# Patient Record
Sex: FEMALE | Race: NATIVE HAWAIIAN OR OTHER PACIFIC ISLANDER | HISPANIC OR LATINO | URBAN - METROPOLITAN AREA
[De-identification: names, ages, dates, MRNs, and addresses within clinical notes are randomized per-mention and may not be internally consistent; named-entity substitution may affect disease eponyms.]

---

## 2024-08-23 ENCOUNTER — LAB VISIT (OUTPATIENT)
Dept: LAB | Facility: HOSPITAL | Age: 29
End: 2024-08-23
Payer: COMMERCIAL

## 2024-08-23 ENCOUNTER — OFFICE VISIT (OUTPATIENT)
Dept: OBSTETRICS AND GYNECOLOGY | Facility: CLINIC | Age: 29
End: 2024-08-23
Payer: COMMERCIAL

## 2024-08-23 VITALS — HEART RATE: 78 BPM | SYSTOLIC BLOOD PRESSURE: 132 MMHG | WEIGHT: 153.69 LBS | DIASTOLIC BLOOD PRESSURE: 92 MMHG

## 2024-08-23 DIAGNOSIS — Z30.09 COUNSELING FOR BIRTH CONTROL REGARDING INTRAUTERINE DEVICE (IUD): ICD-10-CM

## 2024-08-23 DIAGNOSIS — Z32.02 NEGATIVE PREGNANCY TEST: ICD-10-CM

## 2024-08-23 DIAGNOSIS — Z30.09 CONTRACEPTIVE EDUCATION: ICD-10-CM

## 2024-08-23 DIAGNOSIS — Z11.3 ROUTINE SCREENING FOR STI (SEXUALLY TRANSMITTED INFECTION): ICD-10-CM

## 2024-08-23 DIAGNOSIS — N89.8 VAGINAL DISCHARGE: Primary | ICD-10-CM

## 2024-08-23 LAB
HBV SURFACE AG SERPL QL IA: NORMAL
HCV AB SERPL QL IA: NORMAL
HIV 1+2 AB+HIV1 P24 AG SERPL QL IA: NORMAL
TREPONEMA PALLIDUM IGG+IGM AB [PRESENCE] IN SERUM OR PLASMA BY IMMUNOASSAY: NONREACTIVE

## 2024-08-23 PROCEDURE — 87389 HIV-1 AG W/HIV-1&-2 AB AG IA: CPT | Performed by: NURSE PRACTITIONER

## 2024-08-23 PROCEDURE — 87491 CHLMYD TRACH DNA AMP PROBE: CPT | Performed by: NURSE PRACTITIONER

## 2024-08-23 PROCEDURE — 86593 SYPHILIS TEST NON-TREP QUANT: CPT | Performed by: NURSE PRACTITIONER

## 2024-08-23 PROCEDURE — 36415 COLL VENOUS BLD VENIPUNCTURE: CPT | Performed by: NURSE PRACTITIONER

## 2024-08-23 PROCEDURE — 86803 HEPATITIS C AB TEST: CPT | Performed by: NURSE PRACTITIONER

## 2024-08-23 PROCEDURE — 87340 HEPATITIS B SURFACE AG IA: CPT | Performed by: NURSE PRACTITIONER

## 2024-08-23 PROCEDURE — 99999 PR PBB SHADOW E&M-NEW PATIENT-LVL III: CPT | Mod: PBBFAC,,, | Performed by: NURSE PRACTITIONER

## 2024-08-23 PROCEDURE — 87591 N.GONORRHOEAE DNA AMP PROB: CPT | Performed by: NURSE PRACTITIONER

## 2024-08-23 RX ORDER — DIAZEPAM 5 MG/1
5 TABLET ORAL ONCE
Qty: 1 TABLET | Refills: 0 | Status: SHIPPED | OUTPATIENT
Start: 2024-08-23 | End: 2024-08-23

## 2024-08-23 RX ORDER — FLUCONAZOLE 150 MG/1
150 TABLET ORAL
Qty: 2 TABLET | Refills: 0 | Status: SHIPPED | OUTPATIENT
Start: 2024-08-23 | End: 2024-08-27

## 2024-08-23 NOTE — PROGRESS NOTES
Chief Complaint: Vaginitis/BC Consult     HPI:      Lillian is a 28 y.o. No obstetric history on file. who presents today for vaginitis.  Was recently sexually active past weekend;  on Tuesday - noticed increase in vaginal discharge and abnormal color and then yesterday started having pain/irritation with wiping. Denies vaginal odor or pelvic pain or urinary concerns.     Menses are irregular. Patient's last menstrual period was 07/18/2024.   She previousy had Mirena IUD removed 2 years ago. After IUD removal - amenorrhea/oligomenorrhea.   1 year ago, periods started to normalize. Periods have been every month or 1 week late, last 4 days with moderate flow.     Lillian  is currently sexually active. . She is currently using no method for contraception. She is interested in discussing birth control methods today.     Previous Pap: Negative per patient (1/2024). Denies history of abnormal pap tests.     Gardasil:Completed     History reviewed. No pertinent past medical history.    Current Outpatient Medications:     diazePAM (VALIUM) 5 MG tablet, Take 1 tablet (5 mg total) by mouth once. Take 30 minutes prior to procedure for 1 dose, Disp: 1 tablet, Rfl: 0    fluconazole (DIFLUCAN) 150 MG Tab, Take 1 tablet (150 mg total) by mouth Every 3 (three) days. for 2 doses, Disp: 2 tablet, Rfl: 0   Review of patient's allergies indicates:  No Known Allergies  History reviewed. No pertinent surgical history.  Social History     Tobacco Use    Smoking status: Never    Smokeless tobacco: Never   Substance Use Topics    Drug use: Never     Family History   Problem Relation Name Age of Onset    Diabetes Father         Physical Exam:      PHYSICAL EXAM:  BP (!) 132/92   Pulse 78   Wt 69.7 kg (153 lb 10.6 oz)   LMP 07/18/2024   There is no height or weight on file to calculate BMI.     APPEARANCE: Well nourished, well developed, in no acute distress.  PELVIC:  External genitalia erythematous. No hypopigmentation or lesions  noted. Urethra within normal limits. Vagina without lesions, with thick, white clumpy discharge adhered to vaginal walls; moderate amount of thick white discharge noted in vaginal vault, without erythema, without ulcers.  Cervix without cervical motion tenderness, non-friable. Bimanual exam deferred.     Chaperoned by: JUSTYNA Pereira MA     Assessment/Plan:     Vaginal discharge  -     Vaginosis Screen by DNA Probe  -     C. trachomatis/N. gonorrhoeae by AMP DNA [facility]; Cervicovaginal  -     fluconazole (DIFLUCAN) 150 MG Tab; Take 1 tablet (150 mg total) by mouth Every 3 (three) days. for 2 doses  Dispense: 2 tablet; Refill: 0    Routine screening for STI (sexually transmitted infection)  -     C. trachomatis/N. gonorrhoeae by AMP DNA [facility]; Cervicovaginal  -     Hepatitis B Surface Antigen; Future; Expected date: 08/23/2024  -     Hepatitis C Antibody; Future; Expected date: 08/23/2024  -     HIV 1/2 Ag/Ab (4th Gen); Future; Expected date: 08/23/2024  -     Treponema Pallidium Antibodies IgG, IgM; Future; Expected date: 08/23/2024    Contraceptive education  -     Device Authorization Order  -     diazePAM (VALIUM) 5 MG tablet; Take 1 tablet (5 mg total) by mouth once. Take 30 minutes prior to procedure for 1 dose  Dispense: 1 tablet; Refill: 0    Counseling for birth control regarding intrauterine device (IUD)  -     Device Authorization Order  -     diazePAM (VALIUM) 5 MG tablet; Take 1 tablet (5 mg total) by mouth once. Take 30 minutes prior to procedure for 1 dose  Dispense: 1 tablet; Refill: 0    Negative pregnancy test  -     POCT Urine Pregnancy        PLAN:    GC/CT and Affirm collected. Serum STI screening ordered.   Will treat empirically for VVC based on PE findings. Prescription sent for Fluconazole 150 mg to take in a single dose, repeat in 72 hours if no relief.   After discussing R/B/As of fertility based awareness, barrier contraception, vaginal gel, hormonal pills, injections, patches, rings,  hormonal and non-hormonal IUDs, and the subdermal implant, all questions were answered, and she has opted for Mirena IUD. Today's discussion included:  Risks of IUD placement including but not limited to infection, uterine perforation, migration of device, or IUD expulsion.  Potential changes in bleeding patterns from increased bleeding, intermenstrual spotting, or amenorrhea.   The 0.2% risk of pregnancy with an IUD in place. Patient was thoroughly counseled that if she does become pregnant while she has an IUD, there is an increased risk of both miscarriage and ectopic pregnancy. She was advised to seek medical care immediately if she were to become pregnant.  The need for barrier contraception to prevent exposure to sexually transmitted infections.   All questions were answered, she voiced understanding prior to IUD placement which will be scheduled today.       Follow up if symptoms worsen or fail to improve, for Mirena IUD insertion .

## 2024-08-23 NOTE — PATIENT INSTRUCTIONS
Reminder to take Ibuprofen 800 mg 1 hr prior and Valium 30 minutes prior to your appointment for the Mirena IUD insertion.   We also want to be reasonably certain you are not pregnant when we insert the IUD so it is recommended to   has not had sexual intercourse since the start of last normal menses

## 2024-08-24 LAB
C TRACH DNA SPEC QL NAA+PROBE: NOT DETECTED
N GONORRHOEA DNA SPEC QL NAA+PROBE: NOT DETECTED

## 2024-08-27 ENCOUNTER — PATIENT MESSAGE (OUTPATIENT)
Dept: OBSTETRICS AND GYNECOLOGY | Facility: CLINIC | Age: 29
End: 2024-08-27
Payer: COMMERCIAL

## 2024-09-03 ENCOUNTER — PROCEDURE VISIT (OUTPATIENT)
Dept: OBSTETRICS AND GYNECOLOGY | Facility: CLINIC | Age: 29
End: 2024-09-03
Payer: COMMERCIAL

## 2024-09-03 VITALS — DIASTOLIC BLOOD PRESSURE: 96 MMHG | SYSTOLIC BLOOD PRESSURE: 135 MMHG | HEART RATE: 94 BPM | WEIGHT: 154.56 LBS

## 2024-09-03 DIAGNOSIS — N89.8 VAGINAL DISCHARGE: ICD-10-CM

## 2024-09-03 DIAGNOSIS — Z30.430 ENCOUNTER FOR INSERTION OF MIRENA IUD: Primary | ICD-10-CM

## 2024-09-03 LAB
B-HCG UR QL: NEGATIVE
CTP QC/QA: YES

## 2024-09-03 PROCEDURE — 99499 UNLISTED E&M SERVICE: CPT | Mod: S$GLB,,, | Performed by: NURSE PRACTITIONER

## 2024-09-03 PROCEDURE — 58300 INSERT INTRAUTERINE DEVICE: CPT | Mod: S$GLB,,, | Performed by: NURSE PRACTITIONER

## 2024-09-03 PROCEDURE — 81025 URINE PREGNANCY TEST: CPT | Mod: S$GLB,,, | Performed by: NURSE PRACTITIONER

## 2024-09-03 RX ORDER — FLUCONAZOLE 150 MG/1
150 TABLET ORAL
Qty: 2 TABLET | Refills: 0 | Status: SHIPPED | OUTPATIENT
Start: 2024-09-03 | End: 2024-09-07

## 2024-09-03 NOTE — PROCEDURES
Insertion of IUD    Date/Time: 9/3/2024 9:40 AM    Performed by: Jillian Robertson NP  Authorized by: Jillian Robertson NP    Consent:     Consent obtained:  Prior to procedure the appropriate consent was completed and verified    Consent given by:  Patient    Procedure risks and benefits discussed: yes      Patient questions answered: yes      Patient agrees, verbalizes understanding, and wants to proceed: yes     Device to be inserted was verified by patient: yes    Educational handouts given: no      Instructions and paperwork completed: yes    Insertion Procedure:   1 Intra Uterine Device levonorgestreL 52 mg       Pelvic exam performed: yes      Negative GC/chlamydia test: yes (no new sexual partners since last screening)      Negative urine pregnancy test: yes (abstained from sex since LMP)      Negative serum pregnancy test: no      Cervix cleaned and prepped: yes      Speculum placed in vagina: yes      Tenaculum applied to cervix: yes      Uterus sounded: yes      Uterus sound depth (cm):  7    IUD inserted with no complications: yes      Strings trimmed: yes    Post-procedure:     Patient tolerated procedure well: yes      Patient will follow up after next period: yes    Comments:      Topical lidocaine used throughout the procedure    PLAN     For cramping NSAIDs or Tylenol were recommended.  Patient counseled that irregular bleeding is common in the first several months after IUD placement.  Patient advised to call the office for heavy bleeding, significant pain, or a  foul-smelling vaginal discharge.  Patient counseled that if pregnancy does occur there is an increased chance of miscarriage or ectopic pregnancy, and she should seek medical care immediately.  Patient counseled that her particular IUD is effective as contraception in seven days